# Patient Record
Sex: FEMALE | Race: WHITE | NOT HISPANIC OR LATINO
[De-identification: names, ages, dates, MRNs, and addresses within clinical notes are randomized per-mention and may not be internally consistent; named-entity substitution may affect disease eponyms.]

---

## 2017-01-04 ENCOUNTER — APPOINTMENT (OUTPATIENT)
Dept: ORTHOPEDIC SURGERY | Facility: CLINIC | Age: 72
End: 2017-01-04

## 2017-01-04 DIAGNOSIS — M19.042 PRIMARY OSTEOARTHRITIS, LEFT HAND: ICD-10-CM

## 2017-01-04 DIAGNOSIS — M65.342 TRIGGER FINGER, LEFT RING FINGER: ICD-10-CM

## 2017-01-19 ENCOUNTER — APPOINTMENT (OUTPATIENT)
Dept: ORTHOPEDIC SURGERY | Facility: AMBULATORY SURGERY CENTER | Age: 72
End: 2017-01-19

## 2017-01-19 ENCOUNTER — OUTPATIENT (OUTPATIENT)
Dept: OUTPATIENT SERVICES | Facility: HOSPITAL | Age: 72
LOS: 1 days | Discharge: HOME | End: 2017-01-19
Payer: MEDICARE

## 2017-01-19 PROCEDURE — 26055 INCISE FINGER TENDON SHEATH: CPT | Mod: F8

## 2017-01-25 DIAGNOSIS — Z88.9 ALLERGY STATUS TO UNSPECIFIED DRUGS, MEDICAMENTS AND BIOLOGICAL SUBSTANCES: ICD-10-CM

## 2017-01-25 DIAGNOSIS — Z79.899 OTHER LONG TERM (CURRENT) DRUG THERAPY: ICD-10-CM

## 2017-01-25 DIAGNOSIS — M65.321 TRIGGER FINGER, RIGHT INDEX FINGER: ICD-10-CM

## 2017-01-25 DIAGNOSIS — E03.9 HYPOTHYROIDISM, UNSPECIFIED: ICD-10-CM

## 2017-01-25 DIAGNOSIS — Z88.0 ALLERGY STATUS TO PENICILLIN: ICD-10-CM

## 2017-01-25 DIAGNOSIS — F32.89 OTHER SPECIFIED DEPRESSIVE EPISODES: ICD-10-CM

## 2017-01-25 DIAGNOSIS — F41.8 OTHER SPECIFIED ANXIETY DISORDERS: ICD-10-CM

## 2017-01-27 ENCOUNTER — APPOINTMENT (OUTPATIENT)
Dept: ORTHOPEDIC SURGERY | Facility: CLINIC | Age: 72
End: 2017-01-27

## 2017-01-27 DIAGNOSIS — M65.341 TRIGGER FINGER, RIGHT RING FINGER: ICD-10-CM

## 2017-12-20 ENCOUNTER — APPOINTMENT (OUTPATIENT)
Dept: ORTHOPEDIC SURGERY | Facility: CLINIC | Age: 72
End: 2017-12-20
Payer: MEDICARE

## 2017-12-20 PROCEDURE — 20550 NJX 1 TENDON SHEATH/LIGAMENT: CPT | Mod: F7

## 2017-12-20 PROCEDURE — 73140 X-RAY EXAM OF FINGER(S): CPT | Mod: F7

## 2017-12-20 PROCEDURE — 99214 OFFICE O/P EST MOD 30 MIN: CPT | Mod: 25

## 2017-12-20 RX ORDER — ESCITALOPRAM OXALATE 10 MG/1
10 TABLET ORAL
Qty: 180 | Refills: 0 | Status: ACTIVE | COMMUNITY
Start: 2017-05-16

## 2017-12-20 RX ORDER — ACETAMINOPHEN AND CODEINE PHOSPHATE 300; 30 MG/1; MG/1
300-30 TABLET ORAL
Qty: 40 | Refills: 0 | Status: DISCONTINUED | COMMUNITY
Start: 2017-01-18 | End: 2017-12-20

## 2017-12-20 RX ORDER — METFORMIN HYDROCHLORIDE 500 MG/1
500 TABLET, COATED ORAL
Qty: 120 | Refills: 0 | Status: ACTIVE | COMMUNITY
Start: 2017-05-01

## 2017-12-20 RX ORDER — OXYCODONE AND ACETAMINOPHEN 5; 325 MG/1; MG/1
5-325 TABLET ORAL
Qty: 40 | Refills: 0 | Status: DISCONTINUED | COMMUNITY
Start: 2017-01-20 | End: 2017-12-20

## 2018-04-05 ENCOUNTER — APPOINTMENT (OUTPATIENT)
Dept: ORTHOPEDIC SURGERY | Facility: CLINIC | Age: 73
End: 2018-04-05
Payer: MEDICARE

## 2018-04-05 VITALS — WEIGHT: 140 LBS | RESPIRATION RATE: 16 BRPM | BODY MASS INDEX: 27.48 KG/M2 | HEIGHT: 60 IN

## 2018-04-05 PROCEDURE — 99214 OFFICE O/P EST MOD 30 MIN: CPT | Mod: 25

## 2018-04-05 PROCEDURE — 20550 NJX 1 TENDON SHEATH/LIGAMENT: CPT | Mod: F7

## 2018-04-30 ENCOUNTER — TRANSCRIPTION ENCOUNTER (OUTPATIENT)
Age: 73
End: 2018-04-30

## 2018-05-10 ENCOUNTER — APPOINTMENT (OUTPATIENT)
Dept: ORTHOPEDIC SURGERY | Facility: AMBULATORY SURGERY CENTER | Age: 73
End: 2018-05-10

## 2018-05-18 ENCOUNTER — APPOINTMENT (OUTPATIENT)
Dept: ORTHOPEDIC SURGERY | Facility: CLINIC | Age: 73
End: 2018-05-18

## 2019-02-11 ENCOUNTER — APPOINTMENT (OUTPATIENT)
Dept: ORTHOPEDIC SURGERY | Facility: CLINIC | Age: 74
End: 2019-02-11
Payer: MEDICARE

## 2019-02-11 VITALS — WEIGHT: 140 LBS | BODY MASS INDEX: 27.48 KG/M2 | HEIGHT: 60 IN

## 2019-02-11 DIAGNOSIS — M65.331 TRIGGER FINGER, RIGHT MIDDLE FINGER: ICD-10-CM

## 2019-02-11 PROCEDURE — 99214 OFFICE O/P EST MOD 30 MIN: CPT

## 2019-02-26 ENCOUNTER — APPOINTMENT (OUTPATIENT)
Dept: ORTHOPEDIC SURGERY | Facility: AMBULATORY SURGERY CENTER | Age: 74
End: 2019-02-26

## 2019-03-07 ENCOUNTER — APPOINTMENT (OUTPATIENT)
Dept: ORTHOPEDIC SURGERY | Facility: CLINIC | Age: 74
End: 2019-03-07

## 2019-04-19 ENCOUNTER — TRANSCRIPTION ENCOUNTER (OUTPATIENT)
Age: 74
End: 2019-04-19

## 2019-04-23 ENCOUNTER — FORM ENCOUNTER (OUTPATIENT)
Age: 74
End: 2019-04-23

## 2019-04-24 ENCOUNTER — OUTPATIENT (OUTPATIENT)
Dept: OUTPATIENT SERVICES | Facility: HOSPITAL | Age: 74
LOS: 1 days | End: 2019-04-24
Payer: MEDICARE

## 2019-04-24 ENCOUNTER — APPOINTMENT (OUTPATIENT)
Dept: ORTHOPEDIC SURGERY | Facility: CLINIC | Age: 74
End: 2019-04-24
Payer: MEDICARE

## 2019-04-24 DIAGNOSIS — M25.561 PAIN IN RIGHT KNEE: ICD-10-CM

## 2019-04-24 PROCEDURE — 73562 X-RAY EXAM OF KNEE 3: CPT

## 2019-04-24 PROCEDURE — 99214 OFFICE O/P EST MOD 30 MIN: CPT

## 2019-04-24 PROCEDURE — 73562 X-RAY EXAM OF KNEE 3: CPT | Mod: 26,50

## 2019-04-25 VITALS
DIASTOLIC BLOOD PRESSURE: 75 MMHG | HEART RATE: 70 BPM | HEIGHT: 61 IN | SYSTOLIC BLOOD PRESSURE: 118 MMHG | WEIGHT: 153 LBS | OXYGEN SATURATION: 99 % | BODY MASS INDEX: 28.89 KG/M2

## 2019-04-25 NOTE — HISTORY OF PRESENT ILLNESS
[de-identified] : 74 female who fell onto her knee 2 weeks ago and had discomfort. the pain is improving since that time. she presented today for evaluation despite feeling improved. she takes tylenol and aleve prn.

## 2019-04-25 NOTE — ASSESSMENT
[FreeTextEntry1] : resolving contusion of Right knee\par \par plan:\par -wbat\par -aleve PRN\par -continue low impact exercise\par -return PRN...

## 2019-04-25 NOTE — PHYSICAL EXAM
[de-identified] : R knee:\par normal alignment\par no effusion\par stable v/v\par stable lachman\par ROM 0-135\par negative mcmurrays [de-identified] : right knee:\par normal alignment\par mild PF OA\par subtle chondrocalcinosis\par well ossified enchondroma in distal femur with well demarcated border

## 2019-09-23 ENCOUNTER — APPOINTMENT (OUTPATIENT)
Dept: ORTHOPEDIC SURGERY | Facility: CLINIC | Age: 74
End: 2019-09-23
Payer: MEDICARE

## 2019-09-23 DIAGNOSIS — M75.101 UNSPECIFIED ROTATOR CUFF TEAR OR RUPTURE OF RIGHT SHOULDER, NOT SPECIFIED AS TRAUMATIC: ICD-10-CM

## 2019-09-23 PROCEDURE — 99212 OFFICE O/P EST SF 10 MIN: CPT

## 2019-09-25 PROBLEM — M75.101 ROTATOR CUFF SYNDROME OF RIGHT SHOULDER: Status: ACTIVE | Noted: 2019-09-25

## 2019-09-25 NOTE — HISTORY OF PRESENT ILLNESS
[de-identified] : Here today to review the results of a right shoulder MRI that was ordered for her by her primary care physician.

## 2019-09-25 NOTE — ASSESSMENT
[FreeTextEntry1] : Assessment/plan\par Right Rotator cuff syndrome\par \par Explained to the patient in detail that even though her MRI does show extensive findings regarding the right shoulder if she's not having any pain or limitations in function or lifestyle that we are going to leave this alone. If her pain increases in the future that we consider injecting it at that time. She is going to call us if and when this happens and we will see her at her convenience. In the meantime she'll continue all ADLs and activities as tolerated.\par \par \par \par All medical record entries made by the PA/Justin/Fellow are at my, Dr. Fareed Noel's direction and personally dictated by me on 09/23/2019]. I have reviewed the chart and agree that the record accurately reflects my personal performance of the history, physical exam, assessment, and plan. I have also personally directed reviewed, and agreed with the chart.\par

## 2019-09-25 NOTE — PHYSICAL EXAM
[de-identified] : Focused exam of the right shoulder: No swelling edema erythema redness or drainage. Tender anteriorly. Range of motion:/90, abduction 60, internal rotation 60, external rotation 45. 5/5  strength. Positive Neer test. Distally neurovascularly intact. [de-identified] : MRI right shoulder without contrast\par #1 mild to moderate a.c. joint arthrosis\par Her too there is an apparent ganglion cyst which arises from the a.c. joint capsule protruding upward into the subcutaneous fatty tissues. Because his uplifting of the skin.\par #3 small subacromial and please notify. The underlying bursa is mildly thickened and inflamed with small fluid collection.\par #4 thinning and stretching of the subscapularis tendon with small delaminating articular sided partial tear at the caudad most portion of the tendon attachment onto the lesser tuberosity.\par #5 study negative for full thickness rotator cuff tear. There's a small near full-thickness bursal sided partial-thickness tear at the posterior most supraspinatus tendon attachment. There is a mild tendinosis throughout the remainder of the supraspinatus tendon.\par #6 nonvisualization of the intracapsular long biceps tendon, implying that the tendon is even markedly attenuated or completely ruptured and retracted.\par #7 study negative for labral tear.

## 2020-10-22 ENCOUNTER — TRANSCRIPTION ENCOUNTER (OUTPATIENT)
Age: 75
End: 2020-10-22

## 2020-12-11 ENCOUNTER — TRANSCRIPTION ENCOUNTER (OUTPATIENT)
Age: 75
End: 2020-12-11

## 2020-12-13 ENCOUNTER — TRANSCRIPTION ENCOUNTER (OUTPATIENT)
Age: 75
End: 2020-12-13

## 2021-08-04 ENCOUNTER — TRANSCRIPTION ENCOUNTER (OUTPATIENT)
Age: 76
End: 2021-08-04

## 2021-10-06 ENCOUNTER — APPOINTMENT (OUTPATIENT)
Dept: AFTER HOURS CARE | Facility: EMERGENCY ROOM | Age: 76
End: 2021-10-06
Payer: MEDICARE

## 2021-10-06 DIAGNOSIS — T50.Z95A ADVERSE EFFECT OF OTHER VACCINES AND BIOLOGICAL SUBSTANCES, INITIAL ENCOUNTER: ICD-10-CM

## 2021-10-06 PROCEDURE — 99203 OFFICE O/P NEW LOW 30 MIN: CPT | Mod: 95

## 2021-10-07 ENCOUNTER — TRANSCRIPTION ENCOUNTER (OUTPATIENT)
Age: 76
End: 2021-10-07

## 2021-10-08 PROBLEM — T50.Z95A VACCINE REACTION: Status: ACTIVE | Noted: 2021-10-08

## 2021-10-08 NOTE — REVIEW OF SYSTEMS
[Mole Changes] : no mole changes [Nail Changes] : no nail changes [Hair Changes] : no hair changes [Skin Rash] : skin rash [Negative] : Heme/Lymph

## 2021-10-08 NOTE — PHYSICAL EXAM
[Well Nourished] : well nourished [Normal Sclera/Conjunctiva] : normal sclera/conjunctiva [PERRL] : pupils equal round and reactive to light [Normal Outer Ear/Nose] : the outer ears and nose were normal in appearance [Normal Oropharynx] : the oropharynx was normal [Supple] : supple [No Respiratory Distress] : no respiratory distress  [Clear to Auscultation] : lungs were clear to auscultation bilaterally [Normal Rate] : normal rate  [Regular Rhythm] : with a regular rhythm [Normal Gait] : normal gait [Soft] : abdomen soft [Coordination Grossly Intact] : coordination grossly intact [No Focal Deficits] : no focal deficits [de-identified] : Elderely f sitting in bed in no distress [de-identified] : right arm with mild eryuthema, not warm anymore per patient. macular, blanching.

## 2021-10-08 NOTE — ASSESSMENT
[FreeTextEntry1] : Elderly f with right arm erythema s/p pneumonia vaccine.  Per patient, she has multiple allergies per her primary doctor.  Will advise benadryl use, warm compresses and call back if she develops fever, spreading of erythema or worsening symptoms.

## 2021-10-08 NOTE — PLAN
[FreeTextEntry1] : Benadryl, warm compresses and close reassessment with pcp.  She is not diabetic, no fever, no concern for infection at this point.

## 2021-10-08 NOTE — HISTORY OF PRESENT ILLNESS
[Home] : at home, [unfilled] , at the time of the visit. [Other Location: e.g. Home (Enter Location, City,State)___] : at [unfilled] [Verbal consent obtained from patient] : the patient, [unfilled] [FreeTextEntry8] : 77 yo f with hypothyroidism c/o right arm erythema and mild warmth after receiving pneumonia shot in her right arm and flu shot in her left arm this afternoon.  She denies fever, any trauma or history of dvt.  She is a nonsmoker and arm began hurting hours after shot.  She is a nonsmoker, not diabetic.  No sick contacts.  No exacerbating factors.  She took 1 25 mg tablet of benadryl, when it didn’t work, she took another and had some relief.  She has been applying cold compresses to area.  She has never had a problem with vaccines in the past. [Spouse] : spouse

## 2022-01-27 ENCOUNTER — APPOINTMENT (OUTPATIENT)
Dept: HEART AND VASCULAR | Facility: CLINIC | Age: 77
End: 2022-01-27
Payer: MEDICARE

## 2022-01-27 ENCOUNTER — LABORATORY RESULT (OUTPATIENT)
Age: 77
End: 2022-01-27

## 2022-01-27 VITALS
TEMPERATURE: 98 F | OXYGEN SATURATION: 95 % | DIASTOLIC BLOOD PRESSURE: 80 MMHG | SYSTOLIC BLOOD PRESSURE: 137 MMHG | HEART RATE: 76 BPM | HEIGHT: 61 IN

## 2022-01-27 DIAGNOSIS — Z00.00 ENCOUNTER FOR GENERAL ADULT MEDICAL EXAMINATION W/OUT ABNORMAL FINDINGS: ICD-10-CM

## 2022-01-27 PROCEDURE — 99213 OFFICE O/P EST LOW 20 MIN: CPT | Mod: 25

## 2022-01-27 PROCEDURE — 36415 COLL VENOUS BLD VENIPUNCTURE: CPT

## 2022-01-27 PROCEDURE — 93015 CV STRESS TEST SUPVJ I&R: CPT

## 2022-01-27 NOTE — HISTORY OF PRESENT ILLNESS
[FreeTextEntry1] : Patient was having some substernal chest pain despite the fact that she had a calcium score of 0 and a CTA with normal coronaries of note on a CTA in millimeter lesions as she was never a smoker no follow-up was recommended she had a 6 mm lesion at follow-up CT scan showed resolution she had

## 2022-01-27 NOTE — ASSESSMENT
[FreeTextEntry1] : Patient had a negative stress test patient will follow up patient had a CT scan from another provider that showed no suspicious nodules patient will follow-up for an echocardiogram to assess shortness of breath

## 2022-01-28 LAB
ALBUMIN SERPL ELPH-MCNC: 4.9 G/DL
ALP BLD-CCNC: 76 U/L
ALT SERPL-CCNC: 13 U/L
ANION GAP SERPL CALC-SCNC: 13 MMOL/L
AST SERPL-CCNC: 18 U/L
BASOPHILS # BLD AUTO: 0.04 K/UL
BASOPHILS NFR BLD AUTO: 0.4 %
BILIRUB SERPL-MCNC: 0.3 MG/DL
BUN SERPL-MCNC: 20 MG/DL
CALCIUM SERPL-MCNC: 10 MG/DL
CHLORIDE SERPL-SCNC: 101 MMOL/L
CHOLEST SERPL-MCNC: 225 MG/DL
CK SERPL-CCNC: 63 U/L
CO2 SERPL-SCNC: 28 MMOL/L
CREAT SERPL-MCNC: 0.53 MG/DL
EOSINOPHIL # BLD AUTO: 0.21 K/UL
EOSINOPHIL NFR BLD AUTO: 2.2 %
ESTIMATED AVERAGE GLUCOSE: 120 MG/DL
FERRITIN SERPL-MCNC: 307 NG/ML
GLUCOSE SERPL-MCNC: 77 MG/DL
HBA1C MFR BLD HPLC: 5.8 %
HCT VFR BLD CALC: 42.4 %
HDLC SERPL-MCNC: 66 MG/DL
HGB BLD-MCNC: 13.3 G/DL
IMM GRANULOCYTES NFR BLD AUTO: 0.2 %
LDLC SERPL CALC-MCNC: 137 MG/DL
LYMPHOCYTES # BLD AUTO: 2.66 K/UL
LYMPHOCYTES NFR BLD AUTO: 28.4 %
MAN DIFF?: NORMAL
MCHC RBC-ENTMCNC: 30.2 PG
MCHC RBC-ENTMCNC: 31.4 GM/DL
MCV RBC AUTO: 96.1 FL
MONOCYTES # BLD AUTO: 0.53 K/UL
MONOCYTES NFR BLD AUTO: 5.7 %
NEUTROPHILS # BLD AUTO: 5.9 K/UL
NEUTROPHILS NFR BLD AUTO: 63.1 %
NONHDLC SERPL-MCNC: 159 MG/DL
PLATELET # BLD AUTO: 346 K/UL
POTASSIUM SERPL-SCNC: 4.3 MMOL/L
PROT SERPL-MCNC: 7.3 G/DL
RBC # BLD: 4.41 M/UL
RBC # FLD: 13.3 %
SODIUM SERPL-SCNC: 142 MMOL/L
TRIGL SERPL-MCNC: 112 MG/DL
TSH SERPL-ACNC: 0.22 UIU/ML
VIT B12 SERPL-MCNC: 880 PG/ML
WBC # FLD AUTO: 9.36 K/UL

## 2022-02-17 ENCOUNTER — APPOINTMENT (OUTPATIENT)
Dept: PLASTIC SURGERY | Facility: CLINIC | Age: 77
End: 2022-02-17

## 2022-03-03 ENCOUNTER — APPOINTMENT (OUTPATIENT)
Dept: HEART AND VASCULAR | Facility: CLINIC | Age: 77
End: 2022-03-03

## 2022-04-02 ENCOUNTER — EMERGENCY (EMERGENCY)
Facility: HOSPITAL | Age: 77
LOS: 1 days | Discharge: HOME | End: 2022-04-02
Attending: EMERGENCY MEDICINE | Admitting: EMERGENCY MEDICINE
Payer: MEDICARE

## 2022-04-02 VITALS
OXYGEN SATURATION: 96 % | DIASTOLIC BLOOD PRESSURE: 50 MMHG | HEART RATE: 74 BPM | RESPIRATION RATE: 16 BRPM | SYSTOLIC BLOOD PRESSURE: 136 MMHG | WEIGHT: 151.02 LBS | HEIGHT: 60 IN | TEMPERATURE: 98 F

## 2022-04-02 VITALS
OXYGEN SATURATION: 98 % | RESPIRATION RATE: 18 BRPM | SYSTOLIC BLOOD PRESSURE: 144 MMHG | HEART RATE: 63 BPM | TEMPERATURE: 98 F | DIASTOLIC BLOOD PRESSURE: 83 MMHG

## 2022-04-02 DIAGNOSIS — M71.22 SYNOVIAL CYST OF POPLITEAL SPACE [BAKER], LEFT KNEE: ICD-10-CM

## 2022-04-02 DIAGNOSIS — E03.9 HYPOTHYROIDISM, UNSPECIFIED: ICD-10-CM

## 2022-04-02 DIAGNOSIS — M25.562 PAIN IN LEFT KNEE: ICD-10-CM

## 2022-04-02 PROCEDURE — 93971 EXTREMITY STUDY: CPT | Mod: 26,LT

## 2022-04-02 PROCEDURE — 73562 X-RAY EXAM OF KNEE 3: CPT | Mod: 26,LT

## 2022-04-02 PROCEDURE — 73562 X-RAY EXAM OF KNEE 3: CPT

## 2022-04-02 PROCEDURE — 93971 EXTREMITY STUDY: CPT

## 2022-04-02 PROCEDURE — 99284 EMERGENCY DEPT VISIT MOD MDM: CPT

## 2022-04-02 PROCEDURE — 99284 EMERGENCY DEPT VISIT MOD MDM: CPT | Mod: 25

## 2022-04-02 NOTE — ED ADULT NURSE NOTE - OBJECTIVE STATEMENT
Pt complains of left knee pain/thigh since last night while sitting. Pain worst when walking. Pt denies any chest pain, SOB, cough. Fm Hx of blood clots. Takes metformin and synthroid.

## 2022-04-02 NOTE — ED PROVIDER NOTE - OBJECTIVE STATEMENT
76F with no sig PMHx who p/w left knee pain since yesterday. Pt is very active, walks 2-4 miles per day, and had several events yesterday. Around 6-7pm she has worsening posterior left knee pain and difficulty walking. Pain is mostly behind knee with some radiation down calf, no significant swelling, no discoloration, no n/t/w weakness distally. Denies any injury, trauma or fall. She took tylenol and advil yesterday and this morning with some relief and was sent to ER for US to r/o clot. No CP/SOb, f/c, dizziness or syncope. Denies hx of VTE in the past.

## 2022-04-02 NOTE — ED PROVIDER NOTE - CARE PROVIDER_API CALL
Godwin Stephens   INTERNAL MEDICINE  [address] [address]  Phone: [phone]  Fax: [phone]  Follow Up Time:

## 2022-04-02 NOTE — ED ADULT TRIAGE NOTE - CHIEF COMPLAINT QUOTE
Pt reports left knee pain starting yesterday, denies injuries/falls, no swelling/bruising present. Reports some relief with ice.

## 2022-04-02 NOTE — ED PROVIDER NOTE - NSFOLLOWUPINSTRUCTIONS_ED_ALL_ED_FT
Please follow up with your primary care physician and orthopedics. Take tylenol 650mg or motrin 400-800mg as needed every 4-6 hours for pain.   REST- Rest your hurting/injured joint or extremity to decrease pain and swelling for 24-48 hours    ICE- Apply ice to area of pain to decreased inflammation and pain, put towel/barrier between ice and skin. You can keep ice on for 20 minutes at a time 4-8 times daily   COMPRESSION- Wear ace wrap or brace for support to reduce swelling.  Make sure not to wrap too tight, loosen if skin feeling numb/tingling or skin turns blue   ELEVATION- Elevate hurting/injured area 6 or more inches about level of heart to decrease swelling/inflammation.  Use pillow under joint to elevate area   You may call our referrals coordinator at [phone] Monday to Friday 11am-7pm for assistance with making an appointment.  Return to the Emergency Department if you have any new or worsening symptoms, or for any other concerns. Please read below for further information.    Santos Cyst       A Baker cyst, also called a popliteal cyst, is a growth that forms at the back of the knee. The cyst forms when the fluid-filled sac (bursa) that cushions the knee joint becomes enlarged.      What are the causes?    In most cases, a Baker cyst results from another knee problem that causes swelling inside the knee. This makes the fluid inside the knee joint (synovial fluid) flow into the bursa behind the knee, causing the bursa to enlarge.      What increases the risk?    You may be more likely to develop a Baker cyst if you already have a knee problem, such as:  •A tear in cartilage that cushions the knee joint (meniscal tear).      •A tear in the tissues that connect the bones of the knee joint (ligament tear).      •Knee swelling from osteoarthritis, rheumatoid arthritis, or gout.        What are the signs or symptoms?    The main symptom of this condition is a lump behind the knee. This may be the only symptom of the condition. The lump may be painful, especially when the knee is straightened. If the lump is painful, the pain may come and go. The knee may also be stiff.    Symptoms may quickly get more severe if the cyst breaks open (ruptures). If the cyst ruptures, you may feel the following in your knee and calf:  •Sudden or worsening pain.      •Swelling.      •Bruising.      •Redness in the calf.      A Baker cyst does not always cause symptoms.      How is this diagnosed?    This condition may be diagnosed based on your symptoms and medical history. Your health care provider will also do a physical exam. This may include:  •Feeling the cyst to check whether it is tender.      •Checking your knee for signs of another knee condition that causes swelling.      You may have imaging tests, such as:  •X-rays.      •MRI.      •Ultrasound.        How is this treated?    A Baker cyst that is not painful may go away without treatment. If the cyst gets large or painful, it will likely get better if the underlying knee problem is treated.    If needed, treatment for a Baker cyst may include:  •Resting.      •Keeping weight off of the knee. This means not leaning on the knee to support your body weight.      •Taking NSAIDs, such as ibuprofen, to reduce pain and swelling.      •Having a procedure to drain the fluid from the cyst with a needle (aspiration). You may also get an injection of a medicine that reduces swelling (steroid).      •Having surgery. This may be needed if other treatments do not work. This usually involves correcting knee damage and removing the cyst.        Follow these instructions at home:     Activity     •Rest as told by your health care provider.      •Avoid activities that make pain or swelling worse.      •Return to your normal activities as told by your health care provider. Ask your health care provider what activities are safe for you.      • Do not use the injured limb to support your body weight until your health care provider says that you can. Use crutches as told by your health care provider.      General instructions     •Take over-the-counter and prescription medicines only as told by your health care provider.      •Keep all follow-up visits as told by your health care provider. This is important.        Contact a health care provider if:    •You have knee pain, stiffness, or swelling that does not get better.        Get help right away if:    •You have sudden or worsening pain and swelling in your calf area.        Summary    •A Baker cyst, also called a popliteal cyst, is a growth that forms at the back of the knee.      •In most cases, a Baker cyst results from another knee problem that causes swelling inside the knee.      •A Baker cyst that is not painful may go away without treatment.      •If needed, treatment for a Baker cyst may include resting, keeping weight off of the knee, medicines, or draining fluid from the cyst.      •Surgery may be needed if other treatments are not effective.      This information is not intended to replace advice given to you by your health care provider. Make sure you discuss any questions you have with your health care provider.

## 2022-04-02 NOTE — ED PROVIDER NOTE - CLINICAL SUMMARY MEDICAL DECISION MAKING FREE TEXT BOX
76F with no sig PMHx who p/w left knee pain since yesterday. Pt is very active, walks 2-4 miles per day, and had several events yesterday. Around 6-7pm she has worsening posterior left knee pain and difficulty walking. Pain is mostly behind knee with some radiation down calf, no significant swelling, no discoloration, no n/t/w weakness distally. Denies any injury, trauma or fall. She took tylenol and advil yesterday and this morning with some relief and was sent to ER for US to r/o clot. No CP/SOb, f/c, dizziness or syncope. Denies hx of VTE in the past. 76F with no sig PMHx who p/w left knee pain since yesterday. Pt is very active, walks 2-4 miles per day, and had several events yesterday. Around 6-7pm she has worsening posterior left knee pain and difficulty walking. Pain is mostly behind knee with some radiation down calf, no significant swelling, no discoloration, no n/t/w weakness distally. Denies any injury, trauma or fall. She took tylenol and advil yesterday and this morning with some relief and was sent to ER for US to r/o clot. No CP/SOb, f/c, dizziness or syncope. Denies hx of VTE in the past.  VSS, plan for XR and US, ddx Baker's cyst vs dvt vs msk pain.   Pt declines pain meds a this time.    XR shows degenerative changes  US neg for DVT, +popliteal cyst    STable for DC with ACE wrap and outpt ortho follow up.     Pt feeling improved and is stable for DC. ED evaluation and management discussed with the patient in detail.  Close PMD follow up encouraged.  Strict ED return instructions discussed in detail and patient given the opportunity to ask any questions about their discharge diagnosis and instructions. Patient verbalized understanding.

## 2022-04-02 NOTE — ED PROVIDER NOTE - PHYSICAL EXAMINATION
GEN: Well appearing, well developed, awake, alert, oriented to person, place, time/situation and in no apparent distress. NTAF  ENT: Airway patent, Nasal mucosa clear. Mouth with normal mucosa.  EYES: Clear bilaterally. PERRL, EOMI  RESPIRATORY: Breathing comfortably with normal RR. No W/C/R, no hypoxia or resp distress.  CARDIAC: Regular rate and rhythm, no M/R/G  ABDOMEN: Soft, nontender, +bowel sounds, no rebound, rigidity, or guarding.  MSK: Left posterior knee and calf TTP, no anterior knee TTP or swelling, no effusion, otherwise Range of motion is not limited, no deformities noted. Distal ext NVI with +2 pulses, compartments soft, no ligamentous instability.   NEURO: Alert and oriented, no focal deficits.  SKIN: Skin normal color for race, warm, dry and intact. No evidence of rash.  PSYCH: Alert and oriented to person, place, time/situation. normal mood and affect. no apparent risk to self or others.

## 2022-04-02 NOTE — ED PROVIDER NOTE - CARE PLAN
1 Principal Discharge DX:	Knee pain   Principal Discharge DX:	Knee pain  Secondary Diagnosis:	Popliteal cyst, left

## 2022-05-27 ENCOUNTER — EMERGENCY (EMERGENCY)
Facility: HOSPITAL | Age: 77
LOS: 1 days | Discharge: HOME | End: 2022-05-27
Admitting: EMERGENCY MEDICINE
Payer: MEDICARE

## 2022-05-27 VITALS
WEIGHT: 149.91 LBS | HEART RATE: 73 BPM | HEIGHT: 60 IN | RESPIRATION RATE: 18 BRPM | OXYGEN SATURATION: 97 % | SYSTOLIC BLOOD PRESSURE: 149 MMHG | DIASTOLIC BLOOD PRESSURE: 75 MMHG | TEMPERATURE: 97 F

## 2022-05-27 PROBLEM — E03.9 HYPOTHYROIDISM, UNSPECIFIED: Chronic | Status: ACTIVE | Noted: 2022-04-02

## 2022-05-27 PROCEDURE — 99284 EMERGENCY DEPT VISIT MOD MDM: CPT

## 2022-05-27 PROCEDURE — 93970 EXTREMITY STUDY: CPT

## 2022-05-27 PROCEDURE — 99284 EMERGENCY DEPT VISIT MOD MDM: CPT | Mod: 25

## 2022-05-27 PROCEDURE — 93970 EXTREMITY STUDY: CPT | Mod: 26

## 2022-05-27 RX ORDER — ACETAMINOPHEN 500 MG
650 TABLET ORAL ONCE
Refills: 0 | Status: COMPLETED | OUTPATIENT
Start: 2022-05-27 | End: 2022-05-27

## 2022-05-27 RX ADMIN — Medication 650 MILLIGRAM(S): at 15:58

## 2022-05-27 NOTE — ED PROVIDER NOTE - PATIENT PORTAL LINK FT
You can access the FollowMyHealth Patient Portal offered by [facility] by registering at the following website: [email or web address]. By joining [facility]’s FollowMyHealth portal, you will also be able to view your health information using other applications (apps) compatible with our system.

## 2022-05-27 NOTE — ED ADULT TRIAGE NOTE - OTHER COMPLAINTS
patient c/o bilat LE pain x Wednesday-- sent for r/o blood clot-- denies CP/SOB/dizziness-- denies cardiac hx, arrives ambulatory

## 2022-05-27 NOTE — ED PROVIDER NOTE - NSFOLLOWUPINSTRUCTIONS_ED_ALL_ED_FT
Santos Cyst    WHAT YOU NEED TO KNOW:    A Baker cyst is a bulging lump of fluid behind your knee. A Baker cyst can develop if you have a knee injury, or a condition such as osteoarthritis or a connective tissue disorder. A Baker cyst may also be called a popliteal cyst.    DISCHARGE INSTRUCTIONS:    Return to the emergency department if:   •You have severe pain.      •You have bruising on the ankle below the cyst.      •Your calf turns blue below the cyst.      •Your calf or knee is swollen or bleeding.      Call your doctor if:   •You have a fever.      •Your pain does not improve with medicine.      •You have questions or concerns about your condition or care.      Medicines:   •NSAIDs, such as ibuprofen, help decrease swelling, pain, and fever. NSAIDs can cause stomach bleeding or kidney problems in certain people. If you take blood thinner medicine, always ask your healthcare provider if NSAIDs are safe for you. Always read the medicine label and follow directions.      •Take your medicine as directed. Contact your healthcare provider if you think your medicine is not helping or if you have side effects. Tell him of her if you are allergic to any medicine. Keep a list of the medicines, vitamins, and herbs you take. Include the amounts, and when and why you take them. Bring the list or the pill bottles to follow-up visits. Carry your medicine list with you in case of an emergency.      Care for your knee:   •Rest as needed. Limit movement as your knee heals. This will help decrease the risk of more damage to your knee. You may need crutches to take weight off your injured knee. Use crutches as directed.      •Ice your knee. Ice helps decrease swelling and pain. Use an ice pack, or put ice in a plastic bag. Cover it with a towel before you place it on your skin. Ice your knee for 15 to 20 minutes, 3 to 4 times each day. Do this for 2 to 3 days.      •Support your knee. Wrap your knee with an elastic bandage. Ask your healthcare provider if you need a brace for more support. This will help decrease swelling and movement so your knee can heal.      •Elevate your knee. Use pillows to raise your knee above the level of your heart as often as you can. This will help decrease swelling. Do not put the pillow directly under your knee. Put it under your calf instead.  Elevate Leg           •Go to physical therapy as directed. A physical therapist teaches you exercises to help improve movement and strength, and to decrease pain.      Follow up with your doctor as directed: Write down your questions so you remember to ask them during your visits.

## 2022-05-27 NOTE — ED PROVIDER NOTE - MUSCULOSKELETAL, MLM
left leg with +mild calf tenderness, no swelling, no bony left knee tenderness, +FROM, strength 5/5, sensation intact distally.  DP/PT 2+

## 2022-05-27 NOTE — ED PROVIDER NOTE - OBJECTIVE STATEMENT
78 y/o f presents c/o left knee pain, calf pain over the past few weeks.  Pt stating she had an ultrasound recently which showed a baker's cyst, she went to a chiropractor this week who did a rough massage to the area and since then she has been having worsening pain.  Pt stating her pmd sent her for another u/s to r/o a blood clot.  Pt took tylenol earlier with improvement.  Denies fall/trauma, numbness/tingling to ext, all other ROS negative.

## 2022-05-27 NOTE — ED PROVIDER NOTE - CLINICAL SUMMARY MEDICAL DECISION MAKING FREE TEXT BOX
78 y/o f presents c/o pain behind left knee worsening today.  Ext NVI, no trauma/fall, u/a neg for dvt, noted to have baker's cyst unchanged from recent study.  Ace wrap applied to left knee, recommend tylenol PRN pain, f/u pmd.

## 2022-05-27 NOTE — ED ADULT NURSE NOTE - OBJECTIVE STATEMENT
Pt A&Ox4, ambulatory with steady gait, speaking in clear/full sentences, no acute distress, vital signs stable. Pt presents to the ED for left leg swelling and pain since Wednesday. No swelling noted. Pt sent in by PCP for r/o DVT in bilateral lower extremities. Extremity warm to palpation.

## 2022-05-31 DIAGNOSIS — M79.662 PAIN IN LEFT LOWER LEG: ICD-10-CM

## 2022-05-31 DIAGNOSIS — M25.562 PAIN IN LEFT KNEE: ICD-10-CM

## 2022-05-31 DIAGNOSIS — M79.605 PAIN IN LEFT LEG: ICD-10-CM

## 2022-07-29 ENCOUNTER — APPOINTMENT (OUTPATIENT)
Dept: ORTHOPEDIC SURGERY | Facility: CLINIC | Age: 77
End: 2022-07-29

## 2022-07-29 DIAGNOSIS — M25.562 PAIN IN LEFT KNEE: ICD-10-CM

## 2022-08-29 ENCOUNTER — APPOINTMENT (OUTPATIENT)
Dept: ORTHOPEDIC SURGERY | Facility: CLINIC | Age: 77
End: 2022-08-29

## 2022-08-29 VITALS — BODY MASS INDEX: 29.27 KG/M2 | WEIGHT: 155 LBS | HEIGHT: 61 IN

## 2022-08-29 DIAGNOSIS — M71.22 SYNOVIAL CYST OF POPLITEAL SPACE [BAKER], LEFT KNEE: ICD-10-CM

## 2022-08-29 DIAGNOSIS — M17.12 UNILATERAL PRIMARY OSTEOARTHRITIS, LEFT KNEE: ICD-10-CM

## 2022-08-29 PROCEDURE — 73564 X-RAY EXAM KNEE 4 OR MORE: CPT | Mod: LT

## 2022-08-29 PROCEDURE — 99204 OFFICE O/P NEW MOD 45 MIN: CPT

## 2022-08-29 NOTE — HISTORY OF PRESENT ILLNESS
[de-identified] : 77 year old female presents for initial evaluation of chronic left knee pain for years, worse in the last year. The pt denies any specific accident and/or injury. The pain is lateral and posterior with tightness and swelling. Denies buckling, locking, clicking, and LOM. The knee hurts daily and increases with going up and down stairs. She is able to walk 2-3 miles at a time but has pain with walking. She takes Advil prn for pain. Pt received a steroid shot about 6 months ago with minimal relief. She is undergoing PRP injections, which help, and is planning on getting stem cell treatments [place]. Her main concern today is the Baker's cyst.

## 2022-08-29 NOTE — PHYSICAL EXAM
[de-identified] : General appearance: well nourished and hydrated, pleasant, alert and oriented x 3, cooperative.\par HEENT: Normocephalic, EOM intact, Nasal septum midline, Oral cavity clear, External auditory canal clear.\par Cardiovascular: no apparent abnormalities, no lower leg edema, no varicosities, pedal pulses are palpable.\par Lymphatics Lymph nodes: none palpated, Lymphedema: not present.\par Neurologic: sensation is normal, no muscle weakness in upper or lower extremities, patella tendon reflexes intact .\par Dermatologic no apparent skin lesions, moist, warm, no rash.\par Spine:cervical spine appears normal and moves freely, thoracic spine appears normal and moves freely, lumbosacral spine appears normal and moves freely.\par Gait: nonantalgic.\par \par Left knee\par Inspection: trace effusion, no erythema. Popliteal cyst.\par Wounds: none.\par Alignment: normal.\par Palpation: medial tenderness on palpation.\par ROM active (in degrees): 0-125 with crepitus\par Ligamentous laxity: all ligaments appear stable,, negative ant. drawer test, negative post. drawer test, stable to varus stress test, stable to valgus stress test. negative Lachman's test, negative pivot shift test\par Meniscal Test: negative McMurrays, negative Fransico.\par Patellofemoral Alignment Test: Q angle-, normal.\par Muscle Test: good quad strength.\par \par Right knee\par Inspection: no effusion or erythema.\par Wounds: none.\par Alignment: normal.\par Palpation: no specific tenderness on palpation.\par ROM active (in degrees): 0-125\par Ligamentous laxity: all ligaments appear stable,, negative ant. drawer test, negative post. drawer test, stable to varus stress test, stable to valgus stress test. negative Lachman's test, negative pivot shift test\par Meniscal Test: negative McMurrays, negative Fransico.\par Patellofemoral Alignment Test: Q angle-, normal.\par Muscle Test: good quad strength.\par \par Left hip\par Inspection: No swelling or ecchymosis.\par Wounds: none.\par Palpation: non-tender.\par Stability: no instability.\par Strength: 5/5 all motor groups.\par ROM: no pain with FROM.\par Leg length: equal.\par \par Right hip\par Inspection: No swelling or ecchymosis.\par Wounds: none.\par Palpation: non-tender.\par Stability: no instability.\par Strength: 5/5 all motor groups.\par ROM: no pain with FROM.\par Leg length: equal.\par \par Left ankle\par Inspection: no erythema noted, no swelling noted.\par Palpation: no pain on palpation .\par ROM: FROM without crepitus.\par Muscle strength: 5/5.\par Stability: no instability noted.\par \par Right ankle\par Inspection: no erythema noted, no swelling noted.\par ROM: FROM without crepitus.\par Palpation: no pain on palpation .\par Muscle strength: 5/5.\par Stability: no instability noted.\par \par Left foot\par Inspection: color, texture and turgor are normal.\par ROM: full range of motion of all joints without pain or crepitus.\par Palpation: no tenderness.\par Stability: no instability noted.\par \par Right foot\par Inspection: color, texture and turgor are normal.\par ROM: full range of motion of all joints without pain or crepitus.\par Palpation: no tenderness.\par Stability: no instability noted.\par \par Left shoulder\par Inspection: no muscle asymmetry, no atrophy.\par Palpation: no tenderness noted, ACJ non-tender.\par ROM: full active ROM, full passive ROM.\par Strength testing): anterior deltoid, supraspinatus, infraspinatus, subscapularis all 5/5.\par Stability test: ant. apprehension negative, post. apprehension negative, relocation test negative.\par Impingement Test: negative NEER.\par \par Right shoulder\par Inspection: no muscle asymmetry, no atrophy.\par Palpation: no tenderness noted, ACJ non-tender. Tender over the trapezius. Limited total elevation.\par ROM: full active ROM, full passive ROM.\par Strength testing): anterior deltoid, supraspinatus, infraspinatus, subscapularis all 5/5.\par Stability test: ant. apprehension negative, post. apprehension negative, relocation test negative.\par Impingement Test: negative NEER.\par Surgical Wounds: none.\par \par Left elbow\par Inspection: negative swelling.\par Wounds: none.\par Palpation: non-tender.\par ROM: full ROM.\par Strength: 5/5 all groups.\par Stability: no instability.\par Mass: none.\par \par Right elbow\par Inspection: negative swelling.\par Wounds: none.\par Palpation: non-tender.\par ROM: full ROM.\par Strength: 5/5 all groups.\par Stability: no instability.\par Mass: none.\par \par Left wrist\par Inspection: negative swelling.\par Wound: none.\par Palpation (bone): no tenderness.\par ROM: full ROM.\par Strength: full , good.\par \par Right wrist\par Inspection: negative swelling.\par Wound: none.\par Palpation (bone): no tenderness.\par ROM: full ROM.\par Strength: full , good.\par \par Left hand \par Inspection: no skin changes, normal appearance.\par Wounds: none. \par Strength: full , able to make full fist.\par Sensation: light touch intact all fingers and thumb.\par Vascular: good capillary refill < 3 seconds, all fingers and thumb.\par Mass: none. \par \par Right hand\par Inspection: no skin changes, normal appearance. \par Wounds: none.\par Strength: full , able to make full fist. \par Sensation: light touch intact all fingers and thumb. \par Vascular: good capillary refill < 3 seconds, all fingers and thumb.\par Mass: none.  [de-identified] : LEFT knee x-rays, standing AP/Lateral and Merchant films, and 45 degree PA standing view, taken at the office today shows normal alignment, mild medial and lateral joint space narrowing, patella at an appropriate height and central position, Kellgren and Saturnino grade 2. \par \par RIGHT knee x-ray merchant view taken at the office today demonstrates good joint space and a well centered patella. \par \par MRI left knee taken 6/22/22: films brought in and reviewed, see attached report. I agree with radiologist report including Baker's cyst with degenerative arthritis, lateral meniscus tear.\par \par Doppler 5/27/22 - large Baker's cyst

## 2022-08-29 NOTE — END OF VISIT
[FreeTextEntry3] : This note was written by Ora Chisholm on 8/29/22 acting as scribe for Dr. Derik Jean-Baptiste M.D.\par \par I, Dr. Derik Jean-Baptiste, have read and attest that all the information, medical decision making and discharge instructions within are true and accurate.

## 2022-08-29 NOTE — DISCUSSION/SUMMARY
[de-identified] : Discussed at length the nature of the patient's condition. Their left knee symptoms appear secondary to left knee degenerative arthritis with a symptomatic Baker's cyst. Pt is personally undergoing PRP for her degenerative arthritis. She [place] possibly [place]. Regarding her Baker's cyst, I recommended that arrangements be made to have a US guided aspiration. Many questions were asked and answered.

## 2023-07-06 ENCOUNTER — APPOINTMENT (OUTPATIENT)
Dept: OTOLARYNGOLOGY | Facility: CLINIC | Age: 78
End: 2023-07-06
Payer: MEDICARE

## 2023-07-06 VITALS — HEIGHT: 60 IN | WEIGHT: 153 LBS | BODY MASS INDEX: 30.04 KG/M2

## 2023-07-06 PROCEDURE — 99203 OFFICE O/P NEW LOW 30 MIN: CPT | Mod: 25

## 2023-07-06 PROCEDURE — 31231 NASAL ENDOSCOPY DX: CPT

## 2023-07-06 RX ORDER — CLINDAMYCIN HYDROCHLORIDE 150 MG/1
150 CAPSULE ORAL
Qty: 28 | Refills: 0 | Status: COMPLETED | COMMUNITY
Start: 2017-08-30 | End: 2023-07-06

## 2023-07-06 RX ORDER — ESTRADIOL 0.1 MG/D
0.1 PATCH, EXTENDED RELEASE TRANSDERMAL
Qty: 8 | Refills: 0 | Status: COMPLETED | COMMUNITY
Start: 2017-03-24 | End: 2023-07-06

## 2023-07-06 RX ORDER — CIPROFLOXACIN AND DEXAMETHASONE 3; 1 MG/ML; MG/ML
0.3-0.1 SUSPENSION/ DROPS AURICULAR (OTIC)
Qty: 8 | Refills: 0 | Status: COMPLETED | COMMUNITY
Start: 2017-06-26 | End: 2023-07-06

## 2023-07-06 RX ORDER — ESTRADIOL 0.07 MG/D
0.07 PATCH, EXTENDED RELEASE TRANSDERMAL
Qty: 8 | Refills: 0 | Status: COMPLETED | COMMUNITY
Start: 2017-03-21 | End: 2023-07-06

## 2023-07-06 RX ORDER — FLUOCINOLONE ACETONIDE 0.11 MG/ML
0.01 OIL AURICULAR (OTIC)
Qty: 20 | Refills: 0 | Status: COMPLETED | COMMUNITY
Start: 2017-06-26 | End: 2023-07-06

## 2023-07-06 RX ORDER — PHENTERMINE HYDROCHLORIDE 15 MG/1
15 CAPSULE ORAL
Qty: 60 | Refills: 0 | Status: COMPLETED | COMMUNITY
Start: 2017-10-11 | End: 2023-07-06

## 2023-07-06 RX ORDER — NEOMYCIN AND POLYMYXIN B SULFATES, BACITRACIN ZINC, AND HYDROCORTISONE 3.5; 5000; 400; 1 MG/G; [IU]/G; [IU]/G; MG/G
1 OINTMENT TOPICAL
Qty: 15 | Refills: 0 | Status: COMPLETED | COMMUNITY
Start: 2017-06-26 | End: 2023-07-06

## 2023-07-06 NOTE — HISTORY OF PRESENT ILLNESS
[de-identified] : GEOVANNA COELLO is a 78 year old female who comes in complaining of a long history of recurrent right-sided epistaxis.  She has had this cauterized in the past and had cautery done yesterday.  She had another significant right-sided epistaxis this morning.  She does not take anticoagulants but has stopped some vitamin supplements.  She has not had bleeding elsewhere.  The patient had no other ear nose or throat complaints at this visit.

## 2023-07-06 NOTE — PHYSICAL EXAM
[FreeTextEntry1] : \par The patient was alert and oriented and in no distress.\par Voice was clear.\par \par Face:\par The patient had no facial asymmetry or mass.\par The skin was unremarkable.\par \par Eyes:\par The pupils were equal round and reactive to light and accommodation.\par There was no significant nystagmus or disconjugate gaze noted.\par \par Nose: \par The external nose had no significant deformity.  There was no facial tenderness.  On anterior rhinoscopy, the nasal mucosa was clear.  The anterior septum was midline.  There were no visualized polyps purulence  or masses.\par \par Oral cavity:\par The oral mucosa was normal.\par The oral and base of tongue were clear and without mass.\par The gingival and buccal mucosa were moist and without lesions.\par The palate moved well.\par There was no cleft to the palate.\par There appeared to be good salivary flow.  \par There was no pus, erythema or mass in the oral cavity.\par \par \par Ears:\par The external ears were normal without deformity.\par The ear canals were clear.\par The tympanic membranes were intact and normal.\par \par Neck: \par The neck was symmetrical.\par The parotid and submandibular glands were normal without masses.\par The trachea was midline and there was no unusual crepitus.\par The thyroid was smooth and nontender and no masses were palpated.\par There was no significant cervical adenopathy.\par \par \par Neuro:\par Neurologically, the patient was awake, alert, and oriented to person, place and time. There were no obvious focal neurologic abnormalities.  Cranial nerves II through XII were grossly intact.\par \par \par TMJ:\par The temporomandibular joints were nontender.\par There was no abnormal crepitus and no significant malocclusion\par \par \par Nasal endoscopy: \par CPT 02194\par Procedure Note:\par \par Endoscopy was done with Covid precautions and with video. All risks and benefits were discussed with the patient and consent obtained.\par \par Nasal endoscopy was done with topical anesthesia of Pontocaine and Afrin and a      nasal endoscope.\par Indication: Epistaxis\par Procedure: The nasal cavity was anesthetized with topical Afrin and Pontocaine. An  endoscope was used and inserted into the nasal cavity.\par Attention was first paid to the anterior nasal cavity.\par Endocoscopy was performed to inspect the interior of the nasal cavity, the nasal septum,  the middle and superior meati, the inferior, middle and superior turbinates, and the spheno-ethmoidal  recesses, the nasopharynx and eustachian tube orifices bilaterally. including the nasal mocosa, the possibility of polyps and the consistency of the nasal mucous.\par All findings were normal except:\par \par She has a significant right septal deflection with a broad-based area of moist eschar along the septal prominence without evidence of mass or bleeding elsewhere\par \par \par \par

## 2023-07-06 NOTE — ASSESSMENT
[FreeTextEntry1] : It was my impression that the patient had recurrent epistaxis.  There was no active bleeding and too broad based an area to cauterize at this oint.\par  In any case I reviewed the pathogenesis.  I suggested 4 days of Afrin and topical moisturizing for the heating season.  I explained that it will probably take about 2 weeks to heal completely and would like to see the patient should  the bleeding recur.\par Unfortunately, if she bleeds through this, she would require nasal packing which likely would help but is definitely uncomfortable.\par If this heals and isolates to a single area of blood vessel bleeding then I would use electrocautery rather than silver nitrate\par I reassured her that there is no evidence of neoplasia\par Once this has healed I would agree with going ahead with CT imaging, but at this point is likely to be difficult to interpret as there usually will be blood within the sinuses and that would give us a false reading

## 2023-07-06 NOTE — CONSULT LETTER
[FreeTextEntry2] : Ursula Wagoner MD [FreeTextEntry1] : \par \par Dear  Dr. Wagoner\par \par I had the pleasure of seeing your patient today.  \par Please see my note below.\par \par \par Thank you very much for allowing me to participate in the care of your patient.\par \par Sincerely,\par \par Michael rGanados\par \par Uziel Granados MD\par [facility]\par [facility]\par  [facility]\par \par

## 2023-07-13 ENCOUNTER — APPOINTMENT (OUTPATIENT)
Dept: OTOLARYNGOLOGY | Facility: CLINIC | Age: 78
End: 2023-07-13

## 2023-07-18 ENCOUNTER — APPOINTMENT (OUTPATIENT)
Dept: OTOLARYNGOLOGY | Facility: CLINIC | Age: 78
End: 2023-07-18
Payer: MEDICARE

## 2023-07-18 PROCEDURE — 31231 NASAL ENDOSCOPY DX: CPT

## 2023-07-18 PROCEDURE — 99214 OFFICE O/P EST MOD 30 MIN: CPT | Mod: 25

## 2023-07-18 NOTE — HISTORY OF PRESENT ILLNESS
[de-identified] : GEOVANNA COELLO was seen in follow-up on July 18.  She has not had further epistaxis but is using the Afrin.  She notes she feels much better but has 2 to 3 days of significant left-sided otalgia.  The patient had no other ear nose or throat complaints at this visit.

## 2023-07-18 NOTE — PHYSICAL EXAM
[FreeTextEntry1] : General:\par The patient was alert and oriented and in no distress.\par Voice was clear.\par \par Ears:\par The right ear canal was clear the right eardrum was intact and mobile.\par The left ear canal was beefy and inflamed and swollen.  There was enough space to get drops in so I did not put a wick in.  I could not visualize the tympanic membrane but her tuning forks were normal\par \par Oral cavity:\par The oral mucosa was normal.\par The oral and base of tongue were clear and without mass.\par The gingival and buccal mucosa were moist and without lesions.\par The palate moved well.\par There was no cleft to the palate.\par There appeared to be good salivary flow.  \par There was no pus, erythema or mass in the oral cavity.\par \par \par Neck: \par The neck was symmetrical.\par The parotid and submandibular glands were normal without masses.\par The trachea was midline and there was no unusual crepitus.\par The thyroid was smooth and nontender and no masses were palpated.\par There was no significant cervical adenopathy.\par \par \par Face:\par The patient had no facial asymmetry or mass.\par The skin was unremarkable.\par \par \par Nasal endoscopy: \par CPT 62475\par Procedure Note:\par \par Endoscopy was done with Covid precautions and with video. All risks and benefits were discussed with the patient and consent obtained.\par \par Nasal endoscopy was done with topical anesthesia of Pontocaine and Afrin and a      nasal endoscope.\par Indication: Nasal congestion, rule out sinusitis.\par Procedure: The nasal cavity was anesthetized with topical Afrin and Pontocaine. An  endoscope was used and inserted into the nasal cavity.\par Attention was first paid to the anterior nasal cavity.\par Endocoscopy was performed to inspect the interior of the nasal cavity, the nasal septum,  the middle and superior meati, the inferior, middle and superior turbinates, and the spheno-ethmoidal  recesses, the nasopharynx and eustachian tube orifices bilaterally. including the nasal mocosa, the possibility of polyps and the consistency of the nasal mucous.\par All findings were normal except:\par \par She has a broad-based right septal deflection with a moist eschar now.  This was partially removed endoscopically with suction.  This is healing nicely and there is no evidence of recent bleeding\par \par \par

## 2023-07-18 NOTE — ASSESSMENT
[FreeTextEntry1] : It was my impression that she has an acute left-sided otitis externa.  I recommended a course of Cortisporin and keeping the ear dry and gave her ear putty to use.  I could not visualize the tympanic membrane due to the extent of the swelling but her tuning forks were normal.  I asked her to call me and would add an oral antibiotic if she is not improving over 2 to 3 days.  Otherwise, I would like to reevaluate in 2 to 3 weeks to make sure this has responded.  If she cannot get the drops and I would place a wick\par She has the right-sided septal deflection with a chronic epistaxis in the area is much smaller now and healing nicely.  I recommended stopping the Afrin and just using the topical moisturizing and would like to reevaluate in 2 weeks.  Hopefully this will heal without more aggressive intervention.

## 2023-07-18 NOTE — CONSULT LETTER
[FreeTextEntry2] : SEBASTIAN GROSSMAN\par  [FreeTextEntry1] : \par \par Dear  Dr. SEBASTIAN GROSSMAN,\par \par I had the pleasure of seeing your patient today.  \par Please see my note below.\par \par \par Thank you very much for allowing me to participate in the care of your patient.\par \par Sincerely,\par \par \par Uziel Granados MD\par [facility]\par [facility]\par  [facility]\par \par

## 2023-08-01 ENCOUNTER — APPOINTMENT (OUTPATIENT)
Dept: OTOLARYNGOLOGY | Facility: CLINIC | Age: 78
End: 2023-08-01
Payer: MEDICARE

## 2023-08-01 DIAGNOSIS — J06.9 ACUTE UPPER RESPIRATORY INFECTION, UNSPECIFIED: ICD-10-CM

## 2023-08-01 PROCEDURE — 99213 OFFICE O/P EST LOW 20 MIN: CPT | Mod: 25

## 2023-08-01 PROCEDURE — 31231 NASAL ENDOSCOPY DX: CPT

## 2023-08-01 NOTE — ASSESSMENT
[FreeTextEntry1] : It was my impression that she had a significant acute otitis externa that has responded to treatment.  It is my impression that the patient has an an exczematoid otitis externa.  The pathogenesis was discussed.  I suggested avoiding Q-tips.  I recommended topical moisturizing and using either Dermotic drops or other oil drops.  I suggested repeat evaluation in 6 months or earlier should the need arise. The area of her nasal septal ulceration was healing quite well and I recommend continuing just topical moisturizing and hopefully she will need no further intervention.  I would not suggest cauterizing, certainly at this point. It was my impression that she has a probable viral syndrome and I recommended supportive care I would like to reevaluate in a month to make sure that this ulceration had resolved

## 2023-08-01 NOTE — CONSULT LETTER
[FreeTextEntry2] : SEBASTIAN GROSSMAN [FreeTextEntry1] :   Dear  Dr. SEBASTIAN GROSSMAN,  I had the pleasure of seeing your patient today.   Please see my note below.   Thank you very much for allowing me to participate in the care of your patient.  Sincerely,  Uziel [facility]  [facility]

## 2023-08-01 NOTE — HISTORY OF PRESENT ILLNESS
[de-identified] : GEOVANNA COELLO was seen in follow-up on August 1.  She had no further epistaxis.  Her ear feels much better and the pain has resolved but she notes that the ears both itch.  Lastly, she has had a nonproductive cough and a mild sore throat for 2 to 3 days.  COVID testing was negative.  The patient had no other ear nose or throat complaints at this visit.

## 2023-11-22 ENCOUNTER — RX RENEWAL (OUTPATIENT)
Age: 78
End: 2023-11-22

## 2023-11-22 RX ORDER — NEOMYCIN AND POLYMYXIN B SULFATES AND HYDROCORTISONE OTIC 10; 3.5; 1 MG/ML; MG/ML; [USP'U]/ML
3.5-10000-1 SUSPENSION AURICULAR (OTIC) TWICE DAILY
Qty: 10 | Refills: 1 | Status: ACTIVE | COMMUNITY
Start: 2023-07-18 | End: 1900-01-01

## 2024-01-03 ENCOUNTER — APPOINTMENT (OUTPATIENT)
Dept: OTOLARYNGOLOGY | Facility: CLINIC | Age: 79
End: 2024-01-03

## 2024-02-27 ENCOUNTER — APPOINTMENT (OUTPATIENT)
Dept: OTOLARYNGOLOGY | Facility: CLINIC | Age: 79
End: 2024-02-27
Payer: MEDICARE

## 2024-02-27 DIAGNOSIS — J34.2 DEVIATED NASAL SEPTUM: ICD-10-CM

## 2024-02-27 DIAGNOSIS — H90.5 UNSPECIFIED SENSORINEURAL HEARING LOSS: ICD-10-CM

## 2024-02-27 DIAGNOSIS — R04.0 EPISTAXIS: ICD-10-CM

## 2024-02-27 DIAGNOSIS — J31.0 CHRONIC RHINITIS: ICD-10-CM

## 2024-02-27 DIAGNOSIS — H60.549 ACUTE ECZEMATOID OTITIS EXTERNA, UNSPECIFIED EAR: ICD-10-CM

## 2024-02-27 DIAGNOSIS — H90.3 SENSORINEURAL HEARING LOSS, BILATERAL: ICD-10-CM

## 2024-02-27 PROCEDURE — 92557 COMPREHENSIVE HEARING TEST: CPT

## 2024-02-27 PROCEDURE — 92567 TYMPANOMETRY: CPT

## 2024-02-27 PROCEDURE — 31231 NASAL ENDOSCOPY DX: CPT

## 2024-02-27 PROCEDURE — 99214 OFFICE O/P EST MOD 30 MIN: CPT | Mod: 25

## 2024-02-27 NOTE — PHYSICAL EXAM
[FreeTextEntry1] : General: The patient was alert and oriented and in no distress. Voice was clear.  Ears: The external ears were normal without deformity. The ear canals were clear. The tympanic membranes were intact and normal. The canals are shined and devoid of cerumen  Oral cavity: The oral mucosa was normal. The oral and base of tongue were clear and without mass. The gingival and buccal mucosa were moist and without lesions. The palate moved well. There was no cleft to the palate. There appeared to be good salivary flow.   There was no pus, erythema or mass in the oral cavity.  Neck:  The neck was symmetrical. The parotid and submandibular glands were normal without masses. The trachea was midline and there was no unusual crepitus. The thyroid was smooth and nontender and no masses were palpated. There was no significant cervical adenopathy.  Nasal endoscopy:  CPT 98673 Procedure Note:  Endoscopy was done with Covid precautions and with video. All risks and benefits were discussed with the patient and consent obtained.  Nasal endoscopy was done with topical anesthesia of Pontocaine and Afrin and a      nasal endoscope. Indication: Nasal congestion, rule out sinusitis. Procedure: The nasal cavity was anesthetized with topical Afrin and Pontocaine. An  endoscope was used and inserted into the nasal cavity. Attention was first paid to the anterior nasal cavity. Endocoscopy was performed to inspect the interior of the nasal cavity, the nasal septum,  the middle and superior meati, the inferior, middle and superior turbinates, and the spheno-ethmoidal  recesses, the nasopharynx and eustachian tube orifices bilaterally. including the nasal mocosa, the possibility of polyps and the consistency of the nasal mucous. All findings were normal except:  The area of her previous ulceration of mucosa had almost completely healed although there were 3 or 4 little areas of ecchymosis and eschar without active bleeding   Audiogram:  Audiometry showed that both ears had normal hearing through the speech frequencies  with high pitched symmetric sensorineural hearing losses above that.  Reflexes were intact and the patient had type A tympanograms.  The audiogram was reviewed with the patient.

## 2024-02-27 NOTE — HISTORY OF PRESENT ILLNESS
[de-identified] : GEOVANNA COELLO is a 78 year old female who comes in complaining of possible hearing losses.  She notes that her ears itch quite a bit and she uses Q-tips to scratch them.  Additionally, while she had had no further epistaxis since I last saw her, she has the last couple of days had a little bit of blood from the right.  the patient had no other ear nose or throat complaints at this visit.

## 2024-02-27 NOTE — ASSESSMENT
[FreeTextEntry1] : It was my impression that the patient had had an episode of epistaxis.  There was no active bleeding she had several areas of dry eschar and the broad-based area of septal ulceration, had mostly healed with new mucosa I recommended 4 days of Afrin and topical moisturizing It is my impression that the patient has an an exczematoid otitis externa.  The pathogenesis was discussed.  I suggested avoiding Q-tips.  I recommended topical moisturizing and using either Dermotic drops or other oil drops.  I suggested repeat evaluation in 6 months or earlier should the need arise. She has the sensorineural hearing losses but her hearing is good for the speech frequencies and I recommended repeating the hearing test in a year or as needed

## 2024-02-28 PROBLEM — H90.3 SENSORINEURAL HEARING LOSS (SNHL) OF BOTH EARS: Status: ACTIVE | Noted: 2024-02-28

## 2024-05-30 ENCOUNTER — NON-APPOINTMENT (OUTPATIENT)
Age: 79
End: 2024-05-30

## 2024-05-30 ENCOUNTER — APPOINTMENT (OUTPATIENT)
Dept: OPHTHALMOLOGY | Facility: CLINIC | Age: 79
End: 2024-05-30
Payer: MEDICARE

## 2024-05-30 PROCEDURE — 92020 GONIOSCOPY: CPT

## 2024-05-30 PROCEDURE — 92136 OPHTHALMIC BIOMETRY: CPT

## 2024-05-30 PROCEDURE — 92004 COMPRE OPH EXAM NEW PT 1/>: CPT

## 2024-05-30 PROCEDURE — 92025 CPTRIZED CORNEAL TOPOGRAPHY: CPT

## 2024-10-11 ENCOUNTER — APPOINTMENT (OUTPATIENT)
Dept: OTOLARYNGOLOGY | Facility: CLINIC | Age: 79
End: 2024-10-11
Payer: MEDICARE

## 2024-10-11 DIAGNOSIS — H90.5 UNSPECIFIED SENSORINEURAL HEARING LOSS: ICD-10-CM

## 2024-10-11 DIAGNOSIS — H92.02 OTALGIA, LEFT EAR: ICD-10-CM

## 2024-10-11 PROCEDURE — 99213 OFFICE O/P EST LOW 20 MIN: CPT

## 2024-10-11 RX ORDER — TOBRAMYCIN AND DEXAMETHASONE 3; 1 MG/ML; MG/ML
0.3-0.1 SUSPENSION/ DROPS OPHTHALMIC
Qty: 1 | Refills: 1 | Status: ACTIVE | COMMUNITY
Start: 2024-10-11 | End: 1900-01-01

## 2024-10-11 RX ORDER — FLUOCINOLONE ACETONIDE 0.11 MG/ML
0.01 OIL AURICULAR (OTIC)
Qty: 1 | Refills: 2 | Status: ACTIVE | COMMUNITY
Start: 2024-10-11 | End: 1900-01-01

## 2024-10-14 PROBLEM — H92.02 LEFT EAR PAIN: Status: ACTIVE | Noted: 2024-10-14

## 2024-10-16 ENCOUNTER — APPOINTMENT (OUTPATIENT)
Dept: OPHTHALMOLOGY | Facility: AMBULATORY SURGERY CENTER | Age: 79
End: 2024-10-16

## 2024-10-17 ENCOUNTER — APPOINTMENT (OUTPATIENT)
Dept: OPHTHALMOLOGY | Facility: CLINIC | Age: 79
End: 2024-10-17

## 2024-10-23 ENCOUNTER — APPOINTMENT (OUTPATIENT)
Dept: OPHTHALMOLOGY | Facility: CLINIC | Age: 79
End: 2024-10-23

## 2024-10-30 ENCOUNTER — APPOINTMENT (OUTPATIENT)
Dept: OPHTHALMOLOGY | Facility: AMBULATORY SURGERY CENTER | Age: 79
End: 2024-10-30

## 2024-10-31 ENCOUNTER — APPOINTMENT (OUTPATIENT)
Dept: OPHTHALMOLOGY | Facility: CLINIC | Age: 79
End: 2024-10-31

## 2024-11-06 ENCOUNTER — APPOINTMENT (OUTPATIENT)
Dept: OPHTHALMOLOGY | Facility: CLINIC | Age: 79
End: 2024-11-06

## 2025-03-11 NOTE — PHYSICAL EXAM
[FreeTextEntry1] : General: The patient was alert and oriented and in no distress. Voice was clear.  The patient looked less than the stated age  Ears: The external ears were normal without deformity. The ear canals were clear. The tympanic membranes were intact and normal. Both ear canals were dry now and devoid of cerumen and the otitis externa and swelling had resolved  Oral cavity: The oral mucosa was normal. The oral and base of tongue were clear and without mass. The gingival and buccal mucosa were moist and without lesions. The palate moved well. There was no cleft to the palate. There appeared to be good salivary flow.   There was no pus, erythema or mass in the oral cavity. There was no evidence of purulence or significant inflammatory changes   Neck:  The neck was symmetrical. The parotid and submandibular glands were normal without masses. The trachea was midline and there was no unusual crepitus. The thyroid was smooth and nontender and no masses were palpated. There was no significant cervical adenopathy.  Nasal endoscopy:  CPT 80951 Procedure Note:  Endoscopy was done with Covid precautions and with video. All risks and benefits were discussed with the patient and consent obtained.  Nasal endoscopy was done with topical anesthesia of Pontocaine and Afrin and a      nasal endoscope. Indication: Nasal congestion, rule out sinusitis. Procedure: The nasal cavity was anesthetized with topical Afrin and Pontocaine. An  endoscope was used and inserted into the nasal cavity. Attention was first paid to the anterior nasal cavity. Endocoscopy was performed to inspect the interior of the nasal cavity, the nasal septum,  the middle and superior meati, the inferior, middle and superior turbinates, and the spheno-ethmoidal  recesses, the nasopharynx and eustachian tube orifices bilaterally. including the nasal mocosa, the possibility of polyps and the consistency of the nasal mucous. All findings were normal except: She now had a small area of eschar on the right septal prominence but this was markedly improved and there was no evidence of recent bleeding and the ulceration appeared to be healing   Flexible fiberoptic laryngoscopy: CPT 31336 Indications: Dysphagia Procedure note:  Flexible fiberoptic laryngoscopy was performed because of dysphagia and because of the patient's inability to tolerate adequate mirror examination.  The nasal cavity was anesthetized with Pontocaine and Afrin. The flexible endoscope was placed into the patient's nasal cavity. The nasopharynx was without masses. The oropharynx, vallecula and base of tongue had no masses. The epiglottis, aryepiglottic folds and false vocal cords were normal. The pyriform sinuses were without mucosal lesions or pooling of secretions.   The laryngeal ventricles were without lesions. The visualized subglottis was without mass. The lateral and posterior pharyngeal walls were clear and symmetrical. The vocal folds were clear and mobile; they abducted and adducted normally. There was no interarytenoid mass or fullness.  Endoscopy was done with Covid precautions and with video. All risks and benefits were discussed with the patient and consent obtained. There was mild posterior laryngeal inflammation
negative - no fever